# Patient Record
Sex: MALE | Race: WHITE | Employment: STUDENT | URBAN - METROPOLITAN AREA
[De-identification: names, ages, dates, MRNs, and addresses within clinical notes are randomized per-mention and may not be internally consistent; named-entity substitution may affect disease eponyms.]

---
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
## 2024-06-18 NOTE — TELEPHONE ENCOUNTER
Please review. Protocol Failed; No Protocol  Future Appointments   Date Time Provider Department Center   8/12/2024  3:00 PM Caitlin Vazquez MD [facility]        Requested Prescriptions   Pending Prescriptions Disp Refills    TRETINOIN 0.025 % External Cream [Pharmacy Med Name: TRETINOIN 0.025% CREAM] 20 g 0     Sig: Apply 1 Application  topically nightly. Use as tolerated       There is no refill protocol information for this order       CLINDAMYCIN 1 % External Lotion [Pharmacy Med Name: CLINDAMYCIN PHOSP 1% LOTION] 60 mL 0     Sig: Apply 1 Application  topically daily.       There is no refill protocol information for this order            Future Appointments         Provider Department Appt Notes    In 1 month Caitlin Vazquez MD [facility] ([place], [place]) .          Recent Outpatient Visits              10 months ago Routine general medical examination at a health care facility    [facility] ([place], [place]) Caitlin Vazquez MD    Office Visit    1 year ago Routine general medical examination at a health care facility    [facility] Caitlin Vazquez MD    Office Visit    2 [facility]    [facility] Visit    2 years [facility] vulgaris    [facility]    Office Visit    2 years ago Healthy child on routine physical examination    [facility] ([place], [place]) Lou Jha MD    Office Visit

## 2024-06-19 RX ORDER — CLINDAMYCIN PHOSPHATE 10 UG/ML
1 LOTION TOPICAL DAILY
Qty: 60 ML | Refills: 0 | Status: SHIPPED | OUTPATIENT
Start: 2024-06-19

## 2024-08-12 ENCOUNTER — OFFICE VISIT (OUTPATIENT)
Dept: INTERNAL MEDICINE CLINIC | Facility: CLINIC | Age: 19
End: 2024-08-12
Payer: COMMERCIAL

## 2024-08-12 ENCOUNTER — LAB ENCOUNTER (OUTPATIENT)
Dept: LAB | Age: 19
End: 2024-08-12
Attending: INTERNAL MEDICINE
Payer: COMMERCIAL

## 2024-08-12 VITALS
HEIGHT: 73 IN | SYSTOLIC BLOOD PRESSURE: 132 MMHG | HEART RATE: 71 BPM | BODY MASS INDEX: 26.11 KG/M2 | WEIGHT: 197 LBS | DIASTOLIC BLOOD PRESSURE: 72 MMHG

## 2024-08-12 DIAGNOSIS — Z00.00 PHYSICAL EXAM: Primary | ICD-10-CM

## 2024-08-12 DIAGNOSIS — L70.9 ACNE, UNSPECIFIED ACNE TYPE: ICD-10-CM

## 2024-08-12 DIAGNOSIS — Z00.00 PHYSICAL EXAM: ICD-10-CM

## 2024-08-12 LAB
ALBUMIN SERPL-MCNC: 4.6 G/DL (ref 3.2–4.8)
ALBUMIN/GLOB SERPL: 1.5 {RATIO} (ref 1–2)
ALP LIVER SERPL-CCNC: 62 U/L
ALT SERPL-CCNC: 19 U/L
ANION GAP SERPL CALC-SCNC: 7 MMOL/L (ref 0–18)
AST SERPL-CCNC: 29 U/L (ref ?–34)
BASOPHILS # BLD AUTO: 0.06 X10(3) UL (ref 0–0.2)
BASOPHILS NFR BLD AUTO: 0.7 %
BILIRUB SERPL-MCNC: 0.4 MG/DL (ref 0.3–1.2)
BILIRUB UR QL: NEGATIVE
BUN BLD-MCNC: 19 MG/DL (ref 9–23)
BUN/CREAT SERPL: 17.6 (ref 10–20)
CALCIUM BLD-MCNC: 10 MG/DL (ref 8.7–10.4)
CHLORIDE SERPL-SCNC: 105 MMOL/L (ref 98–112)
CHOLEST SERPL-MCNC: 128 MG/DL (ref ?–200)
CLARITY UR: CLEAR
CO2 SERPL-SCNC: 28 MMOL/L (ref 21–32)
CREAT BLD-MCNC: 1.08 MG/DL
DEPRECATED RDW RBC AUTO: 36.2 FL (ref 35.1–46.3)
EGFRCR SERPLBLD CKD-EPI 2021: 101 ML/MIN/1.73M2 (ref 60–?)
EOSINOPHIL # BLD AUTO: 0.06 X10(3) UL (ref 0–0.7)
EOSINOPHIL NFR BLD AUTO: 0.7 %
ERYTHROCYTE [DISTWIDTH] IN BLOOD BY AUTOMATED COUNT: 11.4 % (ref 11–15)
EST. AVERAGE GLUCOSE BLD GHB EST-MCNC: 97 MG/DL (ref 68–126)
FASTING PATIENT LIPID ANSWER: NO
FASTING STATUS PATIENT QL REPORTED: NO
GLOBULIN PLAS-MCNC: 3.1 G/DL (ref 2–3.5)
GLUCOSE BLD-MCNC: 76 MG/DL (ref 70–99)
GLUCOSE UR-MCNC: NORMAL MG/DL
HBA1C MFR BLD: 5 % (ref ?–5.7)
HCT VFR BLD AUTO: 44.6 %
HDLC SERPL-MCNC: 41 MG/DL (ref 40–59)
HGB BLD-MCNC: 15.3 G/DL
HGB UR QL STRIP.AUTO: NEGATIVE
IMM GRANULOCYTES # BLD AUTO: 0.03 X10(3) UL (ref 0–1)
IMM GRANULOCYTES NFR BLD: 0.3 %
KETONES UR-MCNC: NEGATIVE MG/DL
LDLC SERPL CALC-MCNC: 55 MG/DL (ref ?–100)
LEUKOCYTE ESTERASE UR QL STRIP.AUTO: NEGATIVE
LYMPHOCYTES # BLD AUTO: 1.9 X10(3) UL (ref 1–4)
LYMPHOCYTES NFR BLD AUTO: 21.2 %
MCH RBC QN AUTO: 29.5 PG (ref 26–34)
MCHC RBC AUTO-ENTMCNC: 34.3 G/DL (ref 31–37)
MCV RBC AUTO: 86.1 FL
MONOCYTES # BLD AUTO: 0.91 X10(3) UL (ref 0.1–1)
MONOCYTES NFR BLD AUTO: 10.2 %
NEUTROPHILS # BLD AUTO: 6 X10 (3) UL (ref 1.5–7.7)
NEUTROPHILS # BLD AUTO: 6 X10(3) UL (ref 1.5–7.7)
NEUTROPHILS NFR BLD AUTO: 66.9 %
NITRITE UR QL STRIP.AUTO: NEGATIVE
NONHDLC SERPL-MCNC: 87 MG/DL (ref ?–130)
OSMOLALITY SERPL CALC.SUM OF ELEC: 291 MOSM/KG (ref 275–295)
PH UR: 6 [PH] (ref 5–8)
PLATELET # BLD AUTO: 253 10(3)UL (ref 150–450)
POTASSIUM SERPL-SCNC: 4.1 MMOL/L (ref 3.5–5.1)
PROT SERPL-MCNC: 7.7 G/DL (ref 5.7–8.2)
PROT UR-MCNC: NEGATIVE MG/DL
RBC # BLD AUTO: 5.18 X10(6)UL
SODIUM SERPL-SCNC: 140 MMOL/L (ref 136–145)
SP GR UR STRIP: 1.03 (ref 1–1.03)
T4 FREE SERPL-MCNC: 1.3 NG/DL (ref 0.8–1.7)
TRIGL SERPL-MCNC: 197 MG/DL (ref 30–149)
TSI SER-ACNC: 1.84 MIU/ML (ref 0.55–4.78)
UROBILINOGEN UR STRIP-ACNC: NORMAL
VIT D+METAB SERPL-MCNC: 27.2 NG/ML (ref 30–100)
VLDLC SERPL CALC-MCNC: 29 MG/DL (ref 0–30)
WBC # BLD AUTO: 9 X10(3) UL (ref 4–11)

## 2024-08-12 PROCEDURE — 82306 VITAMIN D 25 HYDROXY: CPT

## 2024-08-12 PROCEDURE — 80061 LIPID PANEL: CPT

## 2024-08-12 PROCEDURE — 83036 HEMOGLOBIN GLYCOSYLATED A1C: CPT

## 2024-08-12 PROCEDURE — 84439 ASSAY OF FREE THYROXINE: CPT

## 2024-08-12 PROCEDURE — 85025 COMPLETE CBC W/AUTO DIFF WBC: CPT

## 2024-08-12 PROCEDURE — 81003 URINALYSIS AUTO W/O SCOPE: CPT

## 2024-08-12 PROCEDURE — 36415 COLL VENOUS BLD VENIPUNCTURE: CPT

## 2024-08-12 PROCEDURE — 80053 COMPREHEN METABOLIC PANEL: CPT

## 2024-08-12 PROCEDURE — 84443 ASSAY THYROID STIM HORMONE: CPT

## 2024-08-12 RX ORDER — CLINDAMYCIN PHOSPHATE 10 UG/ML
1 LOTION TOPICAL DAILY
Qty: 60 ML | Refills: 2 | Status: SHIPPED | OUTPATIENT
Start: 2024-08-12

## 2024-08-13 NOTE — PROGRESS NOTES
HPI:    Patient ID: Leo Valdez is a 19 year old male.    HPI    Physical exam  Generally healthy    Immunization reviewed  Pt with chronic acne face trunk with pustules      /72 (BP Location: Right arm, Patient Position: Sitting, Cuff Size: adult)   Pulse 71   Ht 6' 1\" (1.854 m)   Wt 197 lb (89.4 kg)   BMI 25.99 kg/m²   Wt Readings from Last 6 Encounters:   08/12/24 197 lb (89.4 kg)   08/09/23 167 lb (75.8 kg) (69%, Z= 0.49)*   07/13/22 159 lb (72.1 kg) (64%, Z= 0.37)*   10/11/21 135 lb (61.2 kg) (31%, Z= -0.49)*   07/02/20 129 lb (58.5 kg) (36%, Z= -0.36)*   06/27/19 129 lb 3.2 oz (58.6 kg) (54%, Z= 0.09)*     * Growth percentiles are based on CDC (Boys, 2-20 Years) data.     Body mass index is 25.99 kg/m².  HGBA1C:    Lab Results   Component Value Date    A1C 5.4 08/09/2023    A1C 4.8 07/22/2022     08/09/2023         Review of Systems   Constitutional: Negative.  Negative for appetite change, chills, fatigue and fever.   HENT:  Negative for congestion, ear discharge, ear pain, rhinorrhea, sinus pressure, sneezing, sore throat, trouble swallowing and voice change.    Eyes:  Negative for pain and discharge.   Respiratory:  Negative for cough, chest tightness, shortness of breath and wheezing.    Cardiovascular:  Negative for chest pain, palpitations and leg swelling.   Gastrointestinal:  Negative for abdominal distention, abdominal pain, blood in stool, constipation, diarrhea, nausea and vomiting.   Endocrine: Negative for cold intolerance and heat intolerance.   Genitourinary:  Negative for decreased urine volume, difficulty urinating, dysuria, frequency, hematuria and urgency.   Musculoskeletal:  Negative for arthralgias, back pain, gait problem and joint swelling.   Skin:  Negative for rash.        acne   Allergic/Immunologic: Negative for environmental allergies and food allergies.   Neurological:  Negative for dizziness, tremors, seizures, weakness, light-headedness and headaches.    Hematological:  Negative for adenopathy. Does not bruise/bleed easily.   Psychiatric/Behavioral:  Negative for behavioral problems and confusion.          Current Outpatient Medications   Medication Sig Dispense Refill    tretinoin 0.025 % External Cream Apply 1 Application  topically nightly. Use as tolerated 20 g 2    clindamycin 1 % External Lotion Apply 1 Application  topically daily. 60 mL 2     Allergies:No Known Allergies    HISTORY:  Past Medical History:    RECURRENT OTITIS MEDIA      Past Surgical History:   Procedure Laterality Date    Other surgical history      none      Family History   Problem Relation Age of Onset    Asthma Mother     High Cholesterol Paternal Grandfather     Diabetes Neg     Heart Disease Neg       Social History:   Social History     Socioeconomic History    Marital status: Single   Tobacco Use    Smoking status: Never     Passive exposure: Never    Smokeless tobacco: Never   Vaping Use    Vaping status: Never Used   Substance and Sexual Activity    Alcohol use: Yes    Drug use: No   Other Topics Concern    Second-hand smoke exposure No    Reaction to local anesthetic No   Social History Narrative    8th grade, track, cross country        PHYSICAL EXAM:    Physical Exam  Vitals and nursing note reviewed.   HENT:      Head: Normocephalic and atraumatic.      Right Ear: Ear canal normal.      Left Ear: Ear canal normal.      Nose: Nose normal.      Mouth/Throat:      Pharynx: No oropharyngeal exudate.   Eyes:      General: No scleral icterus.        Right eye: No discharge.         Left eye: No discharge.      Conjunctiva/sclera: Conjunctivae normal.      Pupils: Pupils are equal, round, and reactive to light.   Neck:      Thyroid: No thyromegaly.   Cardiovascular:      Rate and Rhythm: Regular rhythm.      Heart sounds: Normal heart sounds. No murmur heard.  Pulmonary:      Effort: Pulmonary effort is normal. No respiratory distress.      Breath sounds: Normal breath sounds. No  wheezing or rales.   Abdominal:      General: Bowel sounds are normal.      Palpations: There is no mass.      Tenderness: There is no abdominal tenderness. There is no rebound.      Hernia: No hernia is present.   Genitourinary:     Penis: Normal.       Testes: Normal.   Musculoskeletal:         General: No tenderness.      Cervical back: Neck supple.   Lymphadenopathy:      Cervical: No cervical adenopathy.   Skin:     General: Skin is warm.      Findings: No rash.      Comments: Acne face shoulder and upper back cystic   Neurological:      Mental Status: He is alert.      Cranial Nerves: No cranial nerve deficit.   Psychiatric:         Behavior: Behavior normal.              ASSESSMENT/PLAN:   (Z00.00) Physical exam  (primary encounter diagnosis)  Plan: CBC With Differential With Platelet, Comp         Metabolic Panel (14), Lipid Panel, Hemoglobin         A1C, TSH and Free T4, Urinalysis, Routine,         Vitamin D  Gnerally healthy  Labs ordered    (L70.9) Acne, unspecified acne type  Plan: DERM - INTERNAL      Refill meds..      Patient voiced understanding  and agrees with plan         Orders Placed This Encounter   Procedures    CBC With Differential With Platelet    Comp Metabolic Panel (14)    Lipid Panel    Hemoglobin A1C    TSH and Free T4    Urinalysis, Routine    Vitamin D       Meds This Visit:  Requested Prescriptions     Signed Prescriptions Disp Refills    tretinoin 0.025 % External Cream 20 g 2     Sig: Apply 1 Application  topically nightly. Use as tolerated    clindamycin 1 % External Lotion 60 mL 2     Sig: Apply 1 Application  topically daily.       Imaging & Referrals:  DERM - INTERNAL        ID#[id number]

## 2025-08-13 ENCOUNTER — OFFICE VISIT (OUTPATIENT)
Dept: INTERNAL MEDICINE CLINIC | Facility: CLINIC | Age: 20
End: 2025-08-13

## 2025-08-13 VITALS
HEART RATE: 71 BPM | TEMPERATURE: 98 F | DIASTOLIC BLOOD PRESSURE: 66 MMHG | SYSTOLIC BLOOD PRESSURE: 130 MMHG | WEIGHT: 174 LBS | HEIGHT: 73 IN | BODY MASS INDEX: 23.06 KG/M2

## 2025-08-13 DIAGNOSIS — E55.9 VITAMIN D DEFICIENCY: ICD-10-CM

## 2025-08-13 DIAGNOSIS — Z11.3 SCREEN FOR STD (SEXUALLY TRANSMITTED DISEASE): ICD-10-CM

## 2025-08-13 DIAGNOSIS — Z00.00 PHYSICAL EXAM: Primary | ICD-10-CM

## 2025-08-15 ENCOUNTER — LAB ENCOUNTER (OUTPATIENT)
Dept: LAB | Age: 20
End: 2025-08-15
Attending: INTERNAL MEDICINE

## 2025-08-15 DIAGNOSIS — E55.9 VITAMIN D DEFICIENCY: ICD-10-CM

## 2025-08-15 DIAGNOSIS — Z11.3 SCREEN FOR STD (SEXUALLY TRANSMITTED DISEASE): ICD-10-CM

## 2025-08-15 DIAGNOSIS — Z00.00 PHYSICAL EXAM: ICD-10-CM

## 2025-08-15 LAB
ALBUMIN SERPL-MCNC: 5 G/DL (ref 3.2–4.8)
ALBUMIN/GLOB SERPL: 1.7 (ref 1–2)
ALP LIVER SERPL-CCNC: 70 U/L (ref 45–117)
ALT SERPL-CCNC: 20 U/L (ref 10–49)
ANION GAP SERPL CALC-SCNC: 6 MMOL/L (ref 0–18)
AST SERPL-CCNC: 21 U/L (ref ?–34)
BASOPHILS # BLD AUTO: 0.05 X10(3) UL (ref 0–0.2)
BASOPHILS NFR BLD AUTO: 0.9 %
BILIRUB SERPL-MCNC: 0.5 MG/DL (ref 0.3–1.2)
BILIRUB UR QL: NEGATIVE
BUN BLD-MCNC: 12 MG/DL (ref 9–23)
BUN/CREAT SERPL: 11 (ref 10–20)
CALCIUM BLD-MCNC: 9.7 MG/DL (ref 8.7–10.4)
CHLORIDE SERPL-SCNC: 103 MMOL/L (ref 98–112)
CHOLEST SERPL-MCNC: 156 MG/DL (ref ?–200)
CLARITY UR: CLEAR
CO2 SERPL-SCNC: 30 MMOL/L (ref 21–32)
COLOR UR: COLORLESS
CREAT BLD-MCNC: 1.09 MG/DL (ref 0.7–1.3)
DEPRECATED RDW RBC AUTO: 37.2 FL (ref 35.1–46.3)
EGFRCR SERPLBLD CKD-EPI 2021: 99 ML/MIN/1.73M2 (ref 60–?)
EOSINOPHIL # BLD AUTO: 0.18 X10(3) UL (ref 0–0.7)
EOSINOPHIL NFR BLD AUTO: 3.2 %
ERYTHROCYTE [DISTWIDTH] IN BLOOD BY AUTOMATED COUNT: 11.5 % (ref 11–15)
EST. AVERAGE GLUCOSE BLD GHB EST-MCNC: 97 MG/DL (ref 68–126)
FASTING PATIENT LIPID ANSWER: YES
FASTING STATUS PATIENT QL REPORTED: YES
GLOBULIN PLAS-MCNC: 3 G/DL (ref 2–3.5)
GLUCOSE BLD-MCNC: 93 MG/DL (ref 70–99)
GLUCOSE UR-MCNC: NORMAL MG/DL
HBA1C MFR BLD: 5 % (ref ?–5.7)
HBV SURFACE AG SER-ACNC: <0.1
HBV SURFACE AG SERPL QL IA: NONREACTIVE
HCT VFR BLD AUTO: 48.9 % (ref 39–53)
HCV AB SERPL QL IA: NONREACTIVE
HDLC SERPL-MCNC: 57 MG/DL (ref 40–59)
HGB BLD-MCNC: 16.3 G/DL (ref 13–17.5)
HGB UR QL STRIP.AUTO: NEGATIVE
IMM GRANULOCYTES # BLD AUTO: 0.01 X10(3) UL (ref 0–1)
IMM GRANULOCYTES NFR BLD: 0.2 %
KETONES UR-MCNC: NEGATIVE MG/DL
LDLC SERPL CALC-MCNC: 82 MG/DL (ref ?–100)
LEUKOCYTE ESTERASE UR QL STRIP.AUTO: NEGATIVE
LYMPHOCYTES # BLD AUTO: 1.92 X10(3) UL (ref 1–4)
LYMPHOCYTES NFR BLD AUTO: 34.4 %
MCH RBC QN AUTO: 29.2 PG (ref 26–34)
MCHC RBC AUTO-ENTMCNC: 33.3 G/DL (ref 31–37)
MCV RBC AUTO: 87.6 FL (ref 80–100)
MONOCYTES # BLD AUTO: 0.5 X10(3) UL (ref 0.1–1)
MONOCYTES NFR BLD AUTO: 9 %
NEUTROPHILS # BLD AUTO: 2.92 X10 (3) UL (ref 1.5–7.7)
NEUTROPHILS # BLD AUTO: 2.92 X10(3) UL (ref 1.5–7.7)
NEUTROPHILS NFR BLD AUTO: 52.3 %
NITRITE UR QL STRIP.AUTO: NEGATIVE
NONHDLC SERPL-MCNC: 99 MG/DL (ref ?–130)
OSMOLALITY SERPL CALC.SUM OF ELEC: 287 MOSM/KG (ref 275–295)
PH UR: 7 (ref 5–8)
PLATELET # BLD AUTO: 255 10(3)UL (ref 150–450)
POTASSIUM SERPL-SCNC: 4.5 MMOL/L (ref 3.5–5.1)
PROT SERPL-MCNC: 8 G/DL (ref 5.7–8.2)
PROT UR-MCNC: NEGATIVE MG/DL
RBC # BLD AUTO: 5.58 X10(6)UL (ref 4.3–5.7)
SODIUM SERPL-SCNC: 139 MMOL/L (ref 136–145)
SP GR UR STRIP: 1.02 (ref 1–1.03)
T PALLIDUM AB SER QL IA: NONREACTIVE
T4 FREE SERPL-MCNC: 1.5 NG/DL (ref 0.8–1.7)
TRIGL SERPL-MCNC: 93 MG/DL (ref 30–149)
TSI SER-ACNC: 1.67 UIU/ML (ref 0.55–4.78)
UROBILINOGEN UR STRIP-ACNC: NORMAL
VIT D+METAB SERPL-MCNC: 20.4 NG/ML (ref 30–100)
VLDLC SERPL CALC-MCNC: 15 MG/DL (ref 0–30)
WBC # BLD AUTO: 5.6 X10(3) UL (ref 4–11)

## 2025-08-15 PROCEDURE — 87340 HEPATITIS B SURFACE AG IA: CPT

## 2025-08-15 PROCEDURE — 83036 HEMOGLOBIN GLYCOSYLATED A1C: CPT

## 2025-08-15 PROCEDURE — 84439 ASSAY OF FREE THYROXINE: CPT

## 2025-08-15 PROCEDURE — 87591 N.GONORRHOEAE DNA AMP PROB: CPT

## 2025-08-15 PROCEDURE — 82306 VITAMIN D 25 HYDROXY: CPT

## 2025-08-15 PROCEDURE — 36415 COLL VENOUS BLD VENIPUNCTURE: CPT

## 2025-08-15 PROCEDURE — 87389 HIV-1 AG W/HIV-1&-2 AB AG IA: CPT

## 2025-08-15 PROCEDURE — 86803 HEPATITIS C AB TEST: CPT

## 2025-08-15 PROCEDURE — 80061 LIPID PANEL: CPT

## 2025-08-15 PROCEDURE — 81003 URINALYSIS AUTO W/O SCOPE: CPT

## 2025-08-15 PROCEDURE — 87491 CHLMYD TRACH DNA AMP PROBE: CPT

## 2025-08-15 PROCEDURE — 86780 TREPONEMA PALLIDUM: CPT

## 2025-08-15 PROCEDURE — 85025 COMPLETE CBC W/AUTO DIFF WBC: CPT

## 2025-08-15 PROCEDURE — 84443 ASSAY THYROID STIM HORMONE: CPT

## 2025-08-15 PROCEDURE — 80053 COMPREHEN METABOLIC PANEL: CPT

## 2025-08-18 LAB
C TRACH DNA SPEC QL NAA+PROBE: NEGATIVE
N GONORRHOEA DNA SPEC QL NAA+PROBE: NEGATIVE

## (undated) NOTE — LETTER
VACCINE ADMINISTRATION RECORD  PARENT / GUARDIAN APPROVAL  Date: 2017  Vaccine administered to:  Lilia Hartley     : 3/19/2005    MRN: [id number]    A copy of the appropriate Centers for Disease Control and Prevention Vaccine Information statement has

## (undated) NOTE — LETTER
Name:  Christopher Fletcher Year:  10th Grade Class: Student ID No.:   Address:  [address] Phone:  [phone] (home)  : [age]year old   Name Relationship [address] Work Phone Home Phone Mobile Phone   1.  Suzan Han 13. Does anyone in your family have a heart problem, pacemaker, or implanted defibrillator? 12. Has anyone in your family had unexplained fainting, seizures, or near drowning?      BONE AND JOINT QUESTIONS Yes No   17. Have you ever had an injury to a b after being hit or falling? 39.Have you ever been unable to move your arms / legs after being hit /fall? 40. Have you ever become ill while exercising in the heat?     41. Do you get frequent muscle cramps when exercising? 42.  Do you or someone · Murmurs (auscultation standing, supine, +/- Valsalva)  · Location of point of maximal impulse (PMI) Yes    Pulses Yes    Lungs Yes    Abdomen Yes    Genitourinary (males only)* Yes    Skin:  HSV, lesions suggestive of MRSA, tinea corporis Yes    Neurolog may be asked to submit to testing for the presence of performance-enhancing substances in my/his/her body either during [employer] state series events or during the school day, and I/our student do/does hereby agree to submit to such testing and analysis by a ce

## (undated) NOTE — Clinical Note
VACCINE ADMINISTRATION RECORD  PARENT / GUARDIAN APPROVAL  Date: 4/15/2017  Vaccine administered to:  Ginette Burden     : 3/19/2005    MRN: [id number]    A copy of the appropriate Centers for Disease Control and Prevention Vaccine Information statement has

## (undated) NOTE — LETTER
Name:  Jatin Prasad Year:  10th Grade Class: Student ID No.:   Address:  [address] Phone:  [phone] (home)  : [age]year old   Name Relationship [address] Work Phone Home Phone Mobile Phone   1.  Conception Aisha 13. Does anyone in your family have a heart problem, pacemaker, or implanted defibrillator? 12. Has anyone in your family had unexplained fainting, seizures, or near drowning?      BONE AND JOINT QUESTIONS Yes No   17. Have you ever had an injury to a b after being hit or falling? 39.Have you ever been unable to move your arms / legs after being hit /fall? 40. Have you ever become ill while exercising in the heat?     41. Do you get frequent muscle cramps when exercising? 42.  Do you or someone · Murmurs (auscultation standing, supine, +/- Valsalva)  · Location of point of maximal impulse (PMI) Yes    Pulses Yes    Lungs Yes    Abdomen Yes    Genitourinary (males only)* Yes    Skin:  HSV, lesions suggestive of MRSA, tinea corporis Yes    Neurolog may be asked to submit to testing for the presence of performance-enhancing substances in my/his/her body either during [employer] state series events or during the school day, and I/our student do/does hereby agree to submit to such testing and analysis by a ce

## (undated) NOTE — MR AVS SNAPSHOT
[facility] [address]  [address], [address]  [address] [facility]  [phone]               Thank you for choosing us for your health care visit with Nurse.   We are glad to serve you and happy to provide you with this summary of your vis accept and enjoy it. It is also important to encourage play time as soon as they start crawling and walking. As your children grow, continue to help them live a healthy active lifestyle.     To lead a healthy active life, families can strive to reach these

## (undated) NOTE — LETTER
[facility] [employer] [place] [employer] Child Health Examination       Student's Name  Krupa Chad Birth Date Signature                                                                                                                                   Title                           Date     Signature Grade Level/ID#  9th Grade   HEALTH HISTORY          TO BE COMPLETED AND SIGNED BY PARENT/GUARDIAN AND VERIFIED BY HEALTH CARE PROVIDER    ALLERGIES  (Food, drug, insect, other)  Patient has no known allergies.  MEDICATION  (List all prescribed or taken on PHYSICAL EXAMINATION REQUIREMENTS (head circumference if <33 years old):   /37   Pulse 61   Ht 6' (1.829 m)   Wt 53.1 kg (117 lb)   BMI 15.87 kg/m²     DIABETES SCREENING  BMI>85% age/sex  No And any two of the following:  Family History No    Orlie Perks Respiratory Yes                   Diagnosis of Asthma: No Mental Health Yes        Currently Prescribed Asthma Medication:            Quick-relief  medication (e.g. Short Acting Beta Antagonist): No          Controller medication (e.g. inhaled corticostero

## (undated) NOTE — LETTER
2017              Leo CRISOSTOMO [address] [facility]        : [birth year]      To whom it may concern,    This letter is to inform you that Leo has a future appointment for a physical exam scheduled with me on 17.

## (undated) NOTE — LETTER
[facility] [employer] Child Health Examination       Student's Name  Peter Bucio Birth Date professional) verifying above immunization history must sign below.   Signature                                                                                                                                      Title              MD             Date  10/1 Authority.            Student's Name  Nitesh Good Birth Date  3/19/2005  Sex  Male School   Grade Level/ID#  12th Grade   HEALTH HISTORY          TO BE COMPLETED AND SIGNED BY PARENT/GUARDIAN AND VERIFIED BY HEALTH CARE PROVIDER    ALLERGIES  (Food, drug, completed by MD/DO/APN/PA       PHYSICAL EXAMINATION REQUIREMENTS (head circumference if <33 years old):   Ht 6' 0.75\"   Wt 61.2 kg (135 lb)   BMI 17.93 kg/m²     DIABETES SCREENING  BMI>85% age/sex  No And any two of the following:  Family History No Respiratory Yes                   Diagnosis of Asthma: No Mental Health Yes        Currently Prescribed Asthma Medication:            Quick-relief  medication (e.g. Short Acting Beta Antagonist): No          Controller medication (e.g. inhaled corticoste

## (undated) NOTE — LETTER
[facility] [employer] Child Health Examination       Student's Name  Beatriz Gifford Birth Date Signature                                                                                                                                   Title                           Date     Signature Grade Level/ID#  8th Grade   HEALTH HISTORY          TO BE COMPLETED AND SIGNED BY PARENT/GUARDIAN AND VERIFIED BY HEALTH CARE PROVIDER    ALLERGIES  (Food, drug, insect, other)  Review of patient's allergies indicates no known allergies.  MEDICATION  [facility] PHYSICAL EXAMINATION REQUIREMENTS (head circumference if <33 years old):   /75   Pulse 71   Ht 5' 11\" (1.803 m)   Wt 52.6 kg (116 lb)   BMI 16.18 kg/m²     DIABETES SCREENING  BMI>85% age/sex  No And any two of the following:  Family History No Respiratory Yes                   Diagnosis of Asthma: No Mental Health Yes        Currently Prescribed Asthma Medication:            Quick-relief  medication (e.g. Short Acting Beta Antagonist): No          Controller medication (e.g. inhaled corticostero

## (undated) NOTE — LETTER
VACCINE ADMINISTRATION RECORD  PARENT / GUARDIAN APPROVAL  Date: 10/11/2021  Vaccine administered to:  Jacquelyn Ji     : 3/19/2005    MRN: [id number]    A copy of the appropriate Centers for Disease Control and Prevention Vaccine Information statement ha

## (undated) NOTE — LETTER
[facility] [employer] Child Health Examination       Student's Name  Ray Sanchez Birth Date Signature                                                                                                                                   Title                           Date     Signature Grade Level/ID#  {DMG_GRADE:1366}   HEALTH HISTORY          TO BE COMPLETED AND SIGNED BY PARENT/GUARDIAN AND VERIFIED BY HEALTH CARE PROVIDER    ALLERGIES  (Food, drug, insect, other)  Review of patient's allergies indicates no known allergies.  Jeff Woods PHYSICAL EXAMINATION REQUIREMENTS (head circumference if <33 years old): There were no vitals taken for this visit.     DIABETES SCREENING  BMI>85% age/sex  {YES_NO:585::\"No\"} And any two of the following:  Family History {YES_NO:585::\"No\"}    Ethnic Eyes {YES:829::\"Yes\"}     Screen result:   Genito-Urinary {YES:829::\"Yes\"}  LMP   Nose {YES:829::\"Yes\"}  Neurological {YES:829::\"Yes\"}    Throat {YES:829::\"Yes\"}  Musculoskeletal {YES:829::\"Yes\"}    Mouth/Dental {YES:829::\"Yes\"}  Spinal exami Signature                                                                                Date  8/29/2017   Address/Phone  Supa Phelan, [place]  [address] [facility] [address]  [phone]   Rev 11/15

## (undated) NOTE — LETTER
[facility] [employer] Child [employer] Examination       Student's Name  Nitesh Good Birth Date immunization history must sign below.   Signature                                                                                                                                   Title                           Date     Signature Birth Date  3/19/2005  Sex  Male School   Grade Level/ID#  {DMG_GRADE:1366}   HEALTH HISTORY          TO BE COMPLETED AND SIGNED BY PARENT/GUARDIAN AND VERIFIED BY HEALTH CARE PROVIDER    ALLERGIES  (Food, drug, insect, other)  Patient has no known allergi EXAMINATION REQUIREMENTS (head circumference if <33 years old):   Ht 6' 0.75\"   Wt 61.2 kg (135 lb)   BMI 17.93 kg/m²     DIABETES SCREENING  BMI>85% age/sex  {YES_NO:585::\"No\"} And any two of the following:  Family History {YES_NO:585::\"No\"}    [facility] Screen result:   Genito-Urinary {YES:829::\"Yes\"}  LMP   Nose {YES:829::\"Yes\"}  Neurological {YES:829::\"Yes\"}    Throat {YES:829::\"Yes\"}  Musculoskeletal {YES:829::\"Yes\"}    Mouth/Dental {YES:829::\"Yes\"}  Spinal examination {YES:829::\"Yes\"} Date  10/11/2021   Address/Phone  [address], [address], [place]  [address], [address] [facility]  [phone]   Rev 11/15                                                                    [place]

## (undated) NOTE — LETTER
Name:  Leelee Flaherty Year:  9th Grade Class: Student ID No.:   Address:  [address] Phone:  [phone] (home)  :  14year old   Name Relationship Lgl [address] Work Phone Home Phone Mobile Phone   1.  Heidi Hitchcock 13. Does anyone in your family have a heart problem, pacemaker, or implanted defibrillator? 12. Has anyone in your family had unexplained fainting, seizures, or near drowning?      BONE AND JOINT QUESTIONS Yes No   17. Have you ever had an injury to a b after being hit or falling? 39.Have you ever been unable to move your arms / legs after being hit /fall? 40. Have you ever become ill while exercising in the heat?     41. Do you get frequent muscle cramps when exercising? 42.  Do you or someone · Murmurs (auscultation standing, supine, +/- Valsalva)  · Location of point of maximal impulse (PMI) Yes    Pulses Yes    Lungs Yes    Abdomen Yes    Genitourinary (males only)* N/A    Skin:  HSV, lesions suggestive of MRSA, tinea corporis Yes    Neurolog may be asked to submit to testing for the presence of performance-enhancing substances in my/his/her body either during [employer] state series events or during the school day, and I/our student do/does hereby agree to submit to such testing and analysis by a ce

## (undated) NOTE — LETTER
VACCINE ADMINISTRATION RECORD  PARENT / GUARDIAN APPROVAL  Date: 2018  Vaccine administered to:  Lorne Khan     : 3/19/2005    MRN: [id number]    A copy of the appropriate Centers for Disease Control and Prevention Vaccine Information statement has

## (undated) NOTE — LETTER
2/25/2019              Leo Valdez        [id number] [facility] [id number]       To whom it may concern,         Jose Casas was seen at my clinic today for a sick visit. Please excuse them from being absent at school Today.  They may retur